# Patient Record
Sex: FEMALE | Race: WHITE | NOT HISPANIC OR LATINO | URBAN - METROPOLITAN AREA
[De-identification: names, ages, dates, MRNs, and addresses within clinical notes are randomized per-mention and may not be internally consistent; named-entity substitution may affect disease eponyms.]

---

## 2019-01-23 ENCOUNTER — HOSPITAL ENCOUNTER (OUTPATIENT)
Dept: GENERAL RADIOLOGY | Facility: HOSPITAL | Age: 24
Discharge: HOME | End: 2019-01-23

## 2023-05-01 ENCOUNTER — OFFICE VISIT (OUTPATIENT)
Dept: SURGERY | Facility: CLINIC | Age: 28
End: 2023-05-01
Payer: COMMERCIAL

## 2023-05-01 VITALS — WEIGHT: 141 LBS | HEIGHT: 66 IN | BODY MASS INDEX: 22.66 KG/M2 | RESPIRATION RATE: 16 BRPM

## 2023-05-01 DIAGNOSIS — R10.33 UMBILICAL PAIN: Primary | ICD-10-CM

## 2023-05-01 RX ORDER — NORETHINDRONE ACETATE AND ETHINYL ESTRADIOL 1MG-20(21)
KIT ORAL
COMMUNITY
Start: 2023-04-10

## 2023-05-01 NOTE — PROGRESS NOTES
"Chief Complaint:  Cyst    Primary Care Provider: System, Provider Not In    Referring Provider:  Dr. Junito Mason M.D.    History of Present Illness  Ludivina Light is a 27 y.o. female for a possible urachal cyst.  The referring provider's note dated 4/21/23 indicates the \"patient states that she was referred to a surgical specialist for a urachal cyst many years ago and that surgeon did not do anything for the cyst and told patient to return if it bothers patient more.  Patient states is having more pain with it and having trouble standing straight up.  Patient is requesting referral to the surgical specialist since previous surgeon has now retired.\"  The patient has not had any drainage from her umbilical area since that time she mentioned when she saw a surgeon and this was about 10 years ago.  Patient has no bulge at her umbilicus.  She occasionally has pain at her umbilicus when she bends over.  No difficulty urinating.  No drainage at all from her umbilicus.  No skin changes at her umbilicus.  No prior abdominal surgeries.  Patient works at [employer].     Allergies: Patient has no known allergies.    No outpatient medications have been marked as taking for the 5/1/23 encounter (Office Visit) with Lee Santos MD.       Past Medical History:   • Seizures    absence seizures. gone away with medication        No past surgical history on file.    Family History: History reviewed. No pertinent family history.     Social History:  Social History     Tobacco Use   • Smoking status: Never   • Smokeless tobacco: Never   Substance Use Topics   • Alcohol use: Not Currently     Alcohol/week: 2.0 standard drinks     Types: 2 Drinks containing 0.5 oz of alcohol per week       Objective     Vital Signs:  Resp 16   Ht 167.6 cm (66\")   Wt 64 kg (141 lb)   BMI 22.76 kg/m²   • Constitutional: healthy appearing, alert, no acute distress, reliable historian, here alone  • HENT:  NCAT, no visible deformities or " lesions  • Eyes:  sclerae clear, conjunctivae clear, EOMI  • Neck:  normal appearance, no masses, trachea midline  • Respiratory:  breathing not labored, respiratory effort appears normal  • Cardiovascular:  heart regular rate  • Abdomen:  soft, nontender, nondistended, umbilicus is normal appearing.  No palpable abnormalities at the umbilicus   • Skin and subcutaneous tissue:  no visible concerning rashes or lesions, no jaundice  • Musculoskeletal: moving all extremities symmetrically and purposefully  • Neurologic:  no obvious motor or sensory deficits, normal gait, able to stand without difficulty, cerebellar function without any obvious abnormalities, alert & oriented x 3, speech clear  • Psychiatric:  judgment and insight intact, mood normal, affect appropriate, cooperative      Assessment:  Umbilical pain    Plan:  CT a/p.  Patient will f/u with me to go over CT scan result.    Lee Santos MD  05/01/2023    Electronically signed by Lee Santos MD, 05/01/23, 5:24 PM EDT.

## 2023-05-08 ENCOUNTER — HOSPITAL ENCOUNTER (OUTPATIENT)
Dept: CT IMAGING | Facility: HOSPITAL | Age: 28
Discharge: HOME | End: 2023-05-08
Admitting: SURGERY
Payer: COMMERCIAL

## 2023-05-08 DIAGNOSIS — R10.33 UMBILICAL PAIN: ICD-10-CM

## 2023-05-08 PROCEDURE — 74177 CT ABD & PELVIS W/CONTRAST: CPT

## 2023-05-08 PROCEDURE — 25510000001 IOPAMIDOL PER 1 ML: Performed by: SURGERY

## 2023-05-08 RX ADMIN — IOPAMIDOL 100 ML: 755 INJECTION, SOLUTION INTRAVENOUS at 09:39

## 2023-05-12 ENCOUNTER — OFFICE VISIT (OUTPATIENT)
Dept: SURGERY | Facility: CLINIC | Age: 28
End: 2023-05-12
Payer: COMMERCIAL

## 2023-05-12 VITALS — HEIGHT: 66 IN | BODY MASS INDEX: 22.66 KG/M2 | RESPIRATION RATE: 16 BRPM | WEIGHT: 141 LBS

## 2023-05-12 DIAGNOSIS — R10.33 UMBILICAL PAIN: Primary | ICD-10-CM

## 2023-05-12 NOTE — PROGRESS NOTES
"Chief Complaint:  No chief complaint on file.    Primary Care Provider: Provider, No Known    Referring Provider: No ref. provider found    History of Present Illness  Ludivina Light is a 27 y.o. female here to see me again after I saw her on 5/1/2023 and decided to evaluate with a CT abdomen pelvis.  A copy and paste of the HPI from that office visit is available below.  The CT scan shows no abnormality.  I reviewed the radiology report and I reviewed the CT scan images myself.  I discussed the radiology findings with the patient and answered her questions    Copy and Paste of HPI from Office Visit on 5/1/23  Ludivina Light is a 27 y.o. female for a possible urachal cyst.  The referring provider's note dated 4/21/23 indicates the \"patient states that she was referred to a surgical specialist for a urachal cyst many years ago and that surgeon did not do anything for the cyst and told patient to return if it bothers patient more.  Patient states is having more pain with it and having trouble standing straight up.  Patient is requesting referral to the surgical specialist since previous surgeon has now retired.\"  The patient has not had any drainage from her umbilical area since that time she mentioned when she saw a surgeon and this was about 10 years ago.  Patient has no bulge at her umbilicus.  She occasionally has pain at her umbilicus when she bends over.  No difficulty urinating.  No drainage at all from her umbilicus.  No skin changes at her umbilicus.  No prior abdominal surgeries.  Patient works at [employer].      Allergies: Patient has no known allergies.    No outpatient medications have been marked as taking for the 5/12/23 encounter (Appointment) with Lee Santos MD.       Past Medical History:   • Seizures    absence seizures. gone away with medication        No past surgical history on file.    Family History: No family history on file.     Social History:  Social History     Tobacco Use "   • Smoking status: Never   • Smokeless tobacco: Never   Substance Use Topics   • Alcohol use: Not Currently     Alcohol/week: 2.0 standard drinks     Types: 2 Drinks containing 0.5 oz of alcohol per week       Objective     Vital Signs:  There were no vitals taken for this visit.  • Constitutional: healthy appearing, alert, no acute distress, reliable historian, here alone  • Respiratory:  breathing not labored, respiratory effort appears normal  • Neurologic:  no obvious motor or sensory deficits, normal gait, cerebellar function without any obvious abnormalities, alert & oriented x 3, speech clear      Assessment:  Umbilical pain - mild, CT scan negative    Plan:  Watchful observation    Lee Santos MD  05/12/2023    Electronically signed by Lee Santos MD, 05/12/23, 2:38 PM EDT.